# Patient Record
Sex: FEMALE | Race: BLACK OR AFRICAN AMERICAN | NOT HISPANIC OR LATINO | URBAN - METROPOLITAN AREA
[De-identification: names, ages, dates, MRNs, and addresses within clinical notes are randomized per-mention and may not be internally consistent; named-entity substitution may affect disease eponyms.]

---

## 2017-08-03 ENCOUNTER — FOLLOW UP ESTABLISHED (OUTPATIENT)
Dept: URBAN - METROPOLITAN AREA CLINIC 30 | Facility: CLINIC | Age: 22
End: 2017-08-03
Payer: COMMERCIAL

## 2017-08-03 DIAGNOSIS — H00.025 HORDEOLUM INTERNUM (MEIBOMIAN GLAND DYSFUNCTION), LEFT LOWER LID: ICD-10-CM

## 2017-08-03 DIAGNOSIS — H00.022 HORDEOLUM INTERNUM (MEIBOMIAN GLAND DYSFUNCTION), RIGHT LOWER LID: ICD-10-CM

## 2017-08-03 DIAGNOSIS — H00.024 HORDEOLUM INTERNUM (MEIBOMIAN GLAND DYSFUNCTION), LEFT UPPER LID: ICD-10-CM

## 2017-08-03 DIAGNOSIS — H00.021 HORDEOLUM INTERNUM (MEIBOMIAN GLAND DYSFUNCTION), RIGHT UPPER LID: ICD-10-CM

## 2017-08-03 PROCEDURE — 83861 MICROFLUID ANALY TEARS: CPT | Performed by: OPTOMETRIST

## 2017-08-03 PROCEDURE — 92014 COMPRE OPH EXAM EST PT 1/>: CPT | Performed by: OPTOMETRIST

## 2017-08-03 RX ORDER — PREDNISOLONE ACETATE 10 MG/ML
1 % SUSPENSION/ DROPS OPHTHALMIC
Qty: 15 | Refills: 1 | Status: INACTIVE
Start: 2017-08-03 | End: 2018-09-19

## 2017-08-03 ASSESSMENT — INTRAOCULAR PRESSURE
OS: 15
OD: 15

## 2017-08-03 ASSESSMENT — VISUAL ACUITY
OD: 20/20
OS: 20/20

## 2018-09-19 ENCOUNTER — FOLLOW UP ESTABLISHED (OUTPATIENT)
Dept: URBAN - METROPOLITAN AREA CLINIC 30 | Facility: CLINIC | Age: 23
End: 2018-09-19
Payer: COMMERCIAL

## 2018-09-19 DIAGNOSIS — H47.391 OTHER DISORDERS OF OPTIC DISC, RIGHT EYE: ICD-10-CM

## 2018-09-19 PROCEDURE — 92133 CPTRZD OPH DX IMG PST SGM ON: CPT | Performed by: OPTOMETRIST

## 2018-09-19 PROCEDURE — 92014 COMPRE OPH EXAM EST PT 1/>: CPT | Performed by: OPTOMETRIST

## 2018-09-19 PROCEDURE — 83861 MICROFLUID ANALY TEARS: CPT | Performed by: OPTOMETRIST

## 2018-09-19 ASSESSMENT — INTRAOCULAR PRESSURE
OS: 20
OD: 18

## 2018-09-19 ASSESSMENT — VISUAL ACUITY
OS: 20/25
OD: 20/25

## 2018-09-19 ASSESSMENT — KERATOMETRY
OS: 42.34
OD: 42.75

## 2018-10-10 ENCOUNTER — FOLLOW UP ESTABLISHED (OUTPATIENT)
Dept: URBAN - METROPOLITAN AREA CLINIC 30 | Facility: CLINIC | Age: 23
End: 2018-10-10
Payer: COMMERCIAL

## 2018-10-10 DIAGNOSIS — H02.532 EYELID RETRACTION RIGHT LOWER LID: ICD-10-CM

## 2018-10-10 DIAGNOSIS — H02.535 EYELID RETRACTION LEFT LOWER LID: ICD-10-CM

## 2018-10-10 DIAGNOSIS — H02.531 EYELID RETRACTION RIGHT UPPPER LID: ICD-10-CM

## 2018-10-10 DIAGNOSIS — H02.534 EYELID RETRACTION LEFT UPPER LID: ICD-10-CM

## 2018-10-10 DIAGNOSIS — H16.223 KERATOCONJUNCT SICCA, NOT SPECIFIED AS SJOGREN'S, BILATERAL: Primary | ICD-10-CM

## 2018-10-10 PROCEDURE — 92012 INTRM OPH EXAM EST PATIENT: CPT | Performed by: OPHTHALMOLOGY

## 2018-10-10 ASSESSMENT — KERATOMETRY
OS: 42.51
OD: 42.85

## 2019-01-11 ENCOUNTER — FOLLOW UP ESTABLISHED (OUTPATIENT)
Dept: URBAN - METROPOLITAN AREA CLINIC 24 | Facility: CLINIC | Age: 24
End: 2019-01-11
Payer: COMMERCIAL

## 2019-01-11 DIAGNOSIS — H10.9 UNSPECIFIED CONJUNCTIVITIS: ICD-10-CM

## 2019-01-11 PROCEDURE — 92012 INTRM OPH EXAM EST PATIENT: CPT | Performed by: OPHTHALMOLOGY

## 2019-01-11 PROCEDURE — 65430 CORNEAL SMEAR: CPT | Performed by: OPHTHALMOLOGY

## 2019-01-11 RX ORDER — POLYMYXIN B SULFATE AND TRIMETHOPRIM SULFATE 100000; 1 [USP'U]/ML; MG/ML
SOLUTION/ DROPS OPHTHALMIC
Qty: 1 | Refills: 1 | Status: ACTIVE
Start: 2019-01-11

## 2019-01-11 ASSESSMENT — VISUAL ACUITY
OD: 20/20
OS: 20/20

## 2019-06-06 ENCOUNTER — OFFICE VISIT (OUTPATIENT)
Dept: URBAN - METROPOLITAN AREA CLINIC 40 | Facility: CLINIC | Age: 24
End: 2019-06-06
Payer: COMMERCIAL

## 2019-06-06 DIAGNOSIS — H04.123 DRY EYE SYNDROME OF BILATERAL LACRIMAL GLANDS: ICD-10-CM

## 2019-06-06 DIAGNOSIS — H53.19 OTHER SUBJECTIVE VISUAL DISTURBANCES: Primary | ICD-10-CM

## 2019-06-06 PROCEDURE — 99204 OFFICE O/P NEW MOD 45 MIN: CPT | Performed by: OPTOMETRIST

## 2019-06-06 ASSESSMENT — INTRAOCULAR PRESSURE
OD: 16
OS: 16

## 2023-03-13 NOTE — IMPRESSION/PLAN
Impression: Dry eye syndrome of bilateral lacrimal glands: H04.123. Plan: Dry eyes account for the patient's complaints. There is no evidence of permanent changes to the cornea. Explained condition does not have a cure and will need artificial tears for maintenance.
Impression: Other subjective visual disturbances: H53.19. Plan: Posterior vitreous detachment accounts for the patient's complaints. There is no evidence of retinal pathology. All signs and risks of retinal detachment and tears were discussed in detail. Patient instructed to call the office immediately if any symptoms noted. Recommend the patient return to office for follow up.
English